# Patient Record
Sex: FEMALE | Race: BLACK OR AFRICAN AMERICAN | NOT HISPANIC OR LATINO
[De-identification: names, ages, dates, MRNs, and addresses within clinical notes are randomized per-mention and may not be internally consistent; named-entity substitution may affect disease eponyms.]

---
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
## 2019-01-15 ENCOUNTER — APPOINTMENT (OUTPATIENT)
Dept: PLASTIC SURGERY | Facility: CLINIC | Age: 19
End: 2019-01-15
Payer: COMMERCIAL

## 2019-01-15 PROCEDURE — 99024 POSTOP FOLLOW-UP VISIT: CPT

## 2019-01-15 NOTE — REASON FOR VISIT
[Post Op: _________] : a [unfilled] post op visit [Parent] : parent [FreeTextEntry1] : DOS 12/19/18 s/p bilateral breast reduction. Pt is doing well; has no complaints.

## 2019-01-15 NOTE — HISTORY OF PRESENT ILLNESS
[FreeTextEntry1] : No complaints. SIte healing well per pt. No excessive bleeding. No fevers. No odor. No purulent discharge. No excessive pain.\par \par SOme dryness and scaling of skin. No itching or rash.

## 2019-03-21 ENCOUNTER — APPOINTMENT (OUTPATIENT)
Dept: PLASTIC SURGERY | Facility: CLINIC | Age: 19
End: 2019-03-21
Payer: COMMERCIAL

## 2019-03-21 DIAGNOSIS — N62 HYPERTROPHY OF BREAST: ICD-10-CM

## 2019-03-21 PROCEDURE — 99212 OFFICE O/P EST SF 10 MIN: CPT

## 2019-03-21 NOTE — HISTORY OF PRESENT ILLNESS
[FreeTextEntry1] : No complaints. SIte healing well per pt. No excessive bleeding. No fevers. No odor. No purulent discharge. No excessive pain. Pt notes relief from back and neck pain. Satisfied with form, shape and size. Not bothered by scarring. \par

## 2019-03-21 NOTE — REASON FOR VISIT
[Follow-Up: _____] : a [unfilled] follow-up visit [FreeTextEntry1] : DOS 12/19/18 s/p bilateral breast reduction. Pt is doing well; has no complaints.

## 2019-04-01 ENCOUNTER — HOSPITAL ENCOUNTER (EMERGENCY)
Dept: HOSPITAL 25 - UCCORT | Age: 19
Discharge: HOME | End: 2019-04-01
Payer: COMMERCIAL

## 2019-04-01 VITALS — DIASTOLIC BLOOD PRESSURE: 68 MMHG | SYSTOLIC BLOOD PRESSURE: 100 MMHG

## 2019-04-01 DIAGNOSIS — R10.31: Primary | ICD-10-CM

## 2019-04-01 DIAGNOSIS — R19.7: ICD-10-CM

## 2019-04-01 PROCEDURE — G0463 HOSPITAL OUTPT CLINIC VISIT: HCPCS

## 2019-04-01 PROCEDURE — 99202 OFFICE O/P NEW SF 15 MIN: CPT

## 2019-04-01 NOTE — UC
Abdominal Pain Female HPI





- HPI Summary


HPI Summary: 





18 y/o female with h/o "enlarged abdominal lymph node" that caused abdominal 

symptoms, loose stools, pain- last episode 3 years ago.  Patient presents with 

abdominal pain, 3/28, with diarrhea the next day, pain was denilson-umbilical but 

now RLQ, no urinary symptoms, no N/V,  + decreased appetite.    Patient does 

not know if this is same as her prior symptoms, has had work up for 

appendicitis in the past.  no other PMH, no pSH  





- History of Current Complaint


Chief Complaint: UCAbdominalPain


Stated Complaint: DIARRHEA x4 DAYS


Time Seen by Provider: 04/01/19 15:56


Hx Obtained From: Patient


Hx Last Menstrual Period: No periods with bcp


Pregnant?: No


Onset/Duration: Sudden Onset, Lasting Days


Severity Initially: Moderate


Severity Currently: Moderate


Pain Intensity: 7


Pain Scale Used: 0-10 Numeric


Location: Discrete At: RLQ


Radiates: No


Allergies/Adverse Reactions: 


 Allergies











Allergy/AdvReac Type Severity Reaction Status Date / Time


 


No Known Allergies Allergy   Verified 04/01/19 15:41











Home Medications: 


 Home Medications





Bismuth Subsalicylate [Pepto-Bismol Max Strength] 525 mg PO Q4H PRN 04/01/19 [

History Confirmed 04/01/19]


Norethindrone-E.estradiol-Iron [Taytulla 1-20 mg-Mcg(24)] 1 cap PO DAILY 04/01/ 19 [History Confirmed 04/01/19]











PMH/Surg Hx/FS Hx/Imm Hx


Previously Healthy: Yes





- Surgical History


Surgical History: Yes


Surgery Procedure, Year, and Place: breast reduction





- Social History


Alcohol Use: Occasionally


Substance Use Type: None


Smoking Status (MU): Never Smoked Tobacco





Review of Systems


All Other Systems Reviewed And Are Negative: Yes


Constitutional: Positive: Negative


Eyes: Positive: Negative


Gastrointestinal: Positive: Abdominal Pain, Diarrhea.  Negative: Vomiting, 

Nausea


Is Patient Immunocompromised?: No





Physical Exam





- Summary


Physical Exam Summary: 





neg psoas sign, + obturator sign 


Triage Information Reviewed: Yes


Appearance: Well-Appearing, No Pain Distress, Well-Nourished


Vital Signs: 


 Initial Vital Signs











Temp  97.3 F   04/01/19 15:44


 


Pulse  77   04/01/19 15:44


 


Resp  18   04/01/19 15:44


 


BP  100/68   04/01/19 15:44


 


Pulse Ox  100   04/01/19 15:44











Vital Signs Reviewed: Yes


Eyes: Positive: Conjunctiva Clear


Abdomen Description: Positive: No Organomegaly, Soft, Guarding - RLQ, 

umbilical.  Negative: CVA Tenderness (R), CVA Tenderness (L), Distended, 

Pulsatile Mass, Splenomegaly


Bowel Sounds: Positive: Present


Musculoskeletal Exam: Normal


Psychological Exam: Normal


Skin Exam: Normal





Abd Pain Female Course/Dx





- Course


Course Of Treatment: 





discussed with patient need for imaging due to + signs for appedicitis.   

Currently, only CT available for patient.  Due to prior radiation, patient 

would prefer US modality.   Sent to [facility] for more work up 








- Differential Dx/Diagnosis


Differential Diagnosis: Appendicitis, Diverticulitis


Provider Diagnosis: 


 Abdominal pain








Discharge





- Sign-Out/Discharge


Documenting (check all that apply): Patient Departure


All imaging exams completed and their final reports reviewed: No Studies





- Discharge Plan


Condition: Good


Disposition: HOME


Patient Education Materials:  Abdominal Pain (ED)


Referrals: 


No Primary Care Phys,NOPCP [Primary Care Provider] - 


Additional Instructions: 


- Referred to [facility] for imaging to rule out appendicitis 


- do not eat anything until seen at the ER 








- Billing Disposition and Condition


Condition: GOOD


Disposition: Home

## 2023-10-02 ENCOUNTER — APPOINTMENT (RX ONLY)
Dept: URBAN - METROPOLITAN AREA CLINIC 108 | Facility: CLINIC | Age: 23
Setting detail: DERMATOLOGY
End: 2023-10-02

## 2023-10-02 DIAGNOSIS — L70.0 ACNE VULGARIS: ICD-10-CM | Status: INADEQUATELY CONTROLLED

## 2023-10-02 DIAGNOSIS — L91.0 HYPERTROPHIC SCAR: ICD-10-CM

## 2023-10-02 PROCEDURE — ? TREATMENT REGIMEN

## 2023-10-02 PROCEDURE — ? PRESCRIPTION MEDICATION MANAGEMENT

## 2023-10-02 PROCEDURE — ? COUNSELING

## 2023-10-02 PROCEDURE — ? FULL BODY SKIN EXAM - DECLINED

## 2023-10-02 PROCEDURE — ? PRESCRIPTION

## 2023-10-02 PROCEDURE — 99203 OFFICE O/P NEW LOW 30 MIN: CPT

## 2023-10-02 RX ORDER — CLINDAMYCIN PHOSPHATE 10 MG/ML
SOLUTION TOPICAL
Qty: 60 | Refills: 2 | Status: ERX | COMMUNITY
Start: 2023-10-02

## 2023-10-02 RX ORDER — TRETIONIN 0.25 MG/G
CREAM TOPICAL
Qty: 45 | Refills: 3 | Status: ERX | COMMUNITY
Start: 2023-10-02

## 2023-10-02 RX ADMIN — TRETIONIN: 0.25 CREAM TOPICAL at 00:00

## 2023-10-02 RX ADMIN — CLINDAMYCIN PHOSPHATE: 10 SOLUTION TOPICAL at 00:00

## 2023-10-02 ASSESSMENT — LOCATION DETAILED DESCRIPTION DERM
LOCATION DETAILED: RIGHT MEDIAL BREAST 5-6:00 REGION
LOCATION DETAILED: RIGHT INFERIOR CENTRAL MALAR CHEEK
LOCATION DETAILED: LEFT INFERIOR CENTRAL MALAR CHEEK

## 2023-10-02 ASSESSMENT — LOCATION ZONE DERM
LOCATION ZONE: TRUNK
LOCATION ZONE: FACE

## 2023-10-02 ASSESSMENT — LOCATION SIMPLE DESCRIPTION DERM
LOCATION SIMPLE: RIGHT BREAST
LOCATION SIMPLE: LEFT CHEEK
LOCATION SIMPLE: RIGHT CHEEK

## 2023-10-02 NOTE — PROCEDURE: COUNSELING
Tazorac Counseling:  Patient advised that medication is irritating and drying.  Patient may need to apply sparingly and wash off after an hour before eventually leaving it on overnight.  The patient verbalized understanding of the proper use and possible adverse effects of tazorac.  All of the patient's questions and concerns were addressed.
Minocycline Counseling: Patient advised regarding possible photosensitivity and discoloration of the teeth, skin, lips, tongue and gums.  Patient instructed to avoid sunlight, if possible.  When exposed to sunlight, patients should wear protective clothing, sunglasses, and sunscreen.  The patient was instructed to call the office immediately if the following severe adverse effects occur:  hearing changes, easy bruising/bleeding, severe headache, or vision changes.  The patient verbalized understanding of the proper use and possible adverse effects of minocycline.  All of the patient's questions and concerns were addressed.
Dapsone Pregnancy And Lactation Text: This medication is Pregnancy Category C and is not considered safe during pregnancy or breast feeding.
Topical Retinoid counseling:  Patient advised to apply a pea-sized amount only at bedtime and wait 30 minutes after washing their face before applying.  If too drying, patient may add a non-comedogenic moisturizer. The patient verbalized understanding of the proper use and possible adverse effects of retinoids.  All of the patient's questions and concerns were addressed.
Use Enhanced Medication Counseling?: No
High Dose Vitamin A Counseling: Side effects reviewed, pt to contact office should one occur.
Winlevi Pregnancy And Lactation Text: This medication is considered safe during pregnancy and breastfeeding.
Bactrim Counseling:  I discussed with the patient the risks of sulfa antibiotics including but not limited to GI upset, allergic reaction, drug rash, diarrhea, dizziness, photosensitivity, and yeast infections.  Rarely, more serious reactions can occur including but not limited to aplastic anemia, agranulocytosis, methemoglobinemia, blood dyscrasias, liver or kidney failure, lung infiltrates or desquamative/blistering drug rashes.
Aklief Pregnancy And Lactation Text: It is unknown if this medication is safe to use during pregnancy.  It is unknown if this medication is excreted in breast milk.  Breastfeeding women should use the topical cream on the smallest area of the skin for the shortest time needed while breastfeeding.  Do not apply to nipple and areola.
Doxycycline Pregnancy And Lactation Text: This medication is Pregnancy Category D and not consider safe during pregnancy. It is also excreted in breast milk but is considered safe for shorter treatment courses.
Tetracycline Pregnancy And Lactation Text: This medication is Pregnancy Category D and not consider safe during pregnancy. It is also excreted in breast milk.
Azithromycin Counseling:  I discussed with the patient the risks of azithromycin including but not limited to GI upset, allergic reaction, drug rash, diarrhea, and yeast infections.
Benzoyl Peroxide Counseling: Patient counseled that medicine may cause skin irritation and bleach clothing.  In the event of skin irritation, the patient was advised to reduce the amount of the drug applied or use it less frequently.   The patient verbalized understanding of the proper use and possible adverse effects of benzoyl peroxide.  All of the patient's questions and concerns were addressed.
Bactrim Pregnancy And Lactation Text: This medication is Pregnancy Category D and is known to cause fetal risk.  It is also excreted in breast milk.
Azelaic Acid Counseling: Patient counseled that medicine may cause skin irritation and to avoid applying near the eyes.  In the event of skin irritation, the patient was advised to reduce the amount of the drug applied or use it less frequently.   The patient verbalized understanding of the proper use and possible adverse effects of azelaic acid.  All of the patient's questions and concerns were addressed.
Erythromycin Counseling:  I discussed with the patient the risks of erythromycin including but not limited to GI upset, allergic reaction, drug rash, diarrhea, increase in liver enzymes, and yeast infections.
Topical Clindamycin Pregnancy And Lactation Text: This medication is Pregnancy Category B and is considered safe during pregnancy. It is unknown if it is excreted in breast milk.
Birth Control Pills Pregnancy And Lactation Text: This medication should be avoided if pregnant and for the first 30 days post-partum.
Topical Sulfur Applications Pregnancy And Lactation Text: This medication is Pregnancy Category C and has an unknown safety profile during pregnancy. It is unknown if this topical medication is excreted in breast milk.
Detail Level: Zone
Spironolactone Pregnancy And Lactation Text: This medication can cause feminization of the male fetus and should be avoided during pregnancy. The active metabolite is also found in breast milk.
Isotretinoin Counseling: Patient should get monthly blood tests, not donate blood, not drive at night if vision affected, not share medication, and not undergo elective surgery for 6 months after tx completed. Side effects reviewed, pt to contact office should one occur.
Aklief counseling:  Patient advised to apply a pea-sized amount only at bedtime and wait 30 minutes after washing their face before applying.  If too drying, patient may add a non-comedogenic moisturizer.  The most commonly reported side effects including irritation, redness, scaling, dryness, stinging, burning, itching, and increased risk of sunburn.  The patient verbalized understanding of the proper use and possible adverse effects of retinoids.  All of the patient's questions and concerns were addressed.
Tetracycline Counseling: Patient counseled regarding possible photosensitivity and increased risk for sunburn.  Patient instructed to avoid sunlight, if possible.  When exposed to sunlight, patients should wear protective clothing, sunglasses, and sunscreen.  The patient was instructed to call the office immediately if the following severe adverse effects occur:  hearing changes, easy bruising/bleeding, severe headache, or vision changes.  The patient verbalized understanding of the proper use and possible adverse effects of tetracycline.  All of the patient's questions and concerns were addressed. Patient understands to avoid pregnancy while on therapy due to potential birth defects.
Topical Retinoid Pregnancy And Lactation Text: This medication is Pregnancy Category C. It is unknown if this medication is excreted in breast milk.
Winlevi Counseling:  I discussed with the patient the risks of topical clascoterone including but not limited to erythema, scaling, itching, and stinging. Patient voiced their understanding.
Azithromycin Pregnancy And Lactation Text: This medication is considered safe during pregnancy and is also secreted in breast milk.
Tazorac Pregnancy And Lactation Text: This medication is not safe during pregnancy. It is unknown if this medication is excreted in breast milk.
High Dose Vitamin A Pregnancy And Lactation Text: High dose vitamin A therapy is contraindicated during pregnancy and breast feeding.
Doxycycline Counseling:  Patient counseled regarding possible photosensitivity and increased risk for sunburn.  Patient instructed to avoid sunlight, if possible.  When exposed to sunlight, patients should wear protective clothing, sunglasses, and sunscreen.  The patient was instructed to call the office immediately if the following severe adverse effects occur:  hearing changes, easy bruising/bleeding, severe headache, or vision changes.  The patient verbalized understanding of the proper use and possible adverse effects of doxycycline.  All of the patient's questions and concerns were addressed.
Topical Sulfur Applications Counseling: Topical Sulfur Counseling: Patient counseled that this medication may cause skin irritation or allergic reactions.  In the event of skin irritation, the patient was advised to reduce the amount of the drug applied or use it less frequently.   The patient verbalized understanding of the proper use and possible adverse effects of topical sulfur application.  All of the patient's questions and concerns were addressed.
Spironolactone Counseling: Patient advised regarding risks of diarrhea, abdominal pain, hyperkalemia, birth defects (for female patients), liver toxicity and renal toxicity. The patient may need blood work to monitor liver and kidney function and potassium levels while on therapy. The patient verbalized understanding of the proper use and possible adverse effects of spironolactone.  All of the patient's questions and concerns were addressed.
Azelaic Acid Pregnancy And Lactation Text: This medication is considered safe during pregnancy and breast feeding.
Erythromycin Pregnancy And Lactation Text: This medication is Pregnancy Category B and is considered safe during pregnancy. It is also excreted in breast milk.
Sarecycline Counseling: Patient advised regarding possible photosensitivity and discoloration of the teeth, skin, lips, tongue and gums.  Patient instructed to avoid sunlight, if possible.  When exposed to sunlight, patients should wear protective clothing, sunglasses, and sunscreen.  The patient was instructed to call the office immediately if the following severe adverse effects occur:  hearing changes, easy bruising/bleeding, severe headache, or vision changes.  The patient verbalized understanding of the proper use and possible adverse effects of sarecycline.  All of the patient's questions and concerns were addressed.
Topical Clindamycin Counseling: Patient counseled that this medication may cause skin irritation or allergic reactions.  In the event of skin irritation, the patient was advised to reduce the amount of the drug applied or use it less frequently.   The patient verbalized understanding of the proper use and possible adverse effects of clindamycin.  All of the patient's questions and concerns were addressed.
Benzoyl Peroxide Pregnancy And Lactation Text: This medication is Pregnancy Category C. It is unknown if benzoyl peroxide is excreted in breast milk.
Isotretinoin Pregnancy And Lactation Text: This medication is Pregnancy Category X and is considered extremely dangerous during pregnancy. It is unknown if it is excreted in breast milk.
Dapsone Counseling: I discussed with the patient the risks of dapsone including but not limited to hemolytic anemia, agranulocytosis, rashes, methemoglobinemia, kidney failure, peripheral neuropathy, headaches, GI upset, and liver toxicity.  Patients who start dapsone require monitoring including baseline LFTs and weekly CBCs for the first month, then every month thereafter.  The patient verbalized understanding of the proper use and possible adverse effects of dapsone.  All of the patient's questions and concerns were addressed.
Birth Control Pills Counseling: Birth Control Pill Counseling: I discussed with the patient the potential side effects of OCPs including but not limited to increased risk of stroke, heart attack, thrombophlebitis, deep venous thrombosis, hepatic adenomas, breast changes, GI upset, headaches, and depression.  The patient verbalized understanding of the proper use and possible adverse effects of OCPs. All of the patient's questions and concerns were addressed.
Detail Level: Detailed

## 2023-10-02 NOTE — HPI: SCAR (COMPLEX), BREAST
What Are Your Concerns? (Check All That Apply): discoloration [id number]: scar_concerns_discoloration

## 2023-10-02 NOTE — PROCEDURE: PRESCRIPTION MEDICATION MANAGEMENT
Detail Level: Zone
Initiate Treatment: clindamycin phosphate 1 % topical swab: Apply to AA on face every morning\\n\\ntretinoin 0.025 % topical cream: Apply pea size amount to face two nights a week at bedtime then increase an additional night as tolerated
Render In Strict Bullet Format?: No
Initiate Treatment: tretinoin 0.025 % topical cream: Apply pea size amount to AA on breast every other night then increase to every night as tolerated

## 2024-03-28 ENCOUNTER — RX ONLY (OUTPATIENT)
Age: 24
Setting detail: RX ONLY
End: 2024-03-28

## 2024-03-28 RX ORDER — TRETIONIN 0.25 MG/G
CREAM TOPICAL
Qty: 45 | Refills: 0 | Status: ERX

## 2024-03-28 RX ORDER — CLINDAMYCIN PHOSPHATE 10 MG/ML
SOLUTION TOPICAL
Qty: 60 | Refills: 0 | Status: ERX